# Patient Record
Sex: MALE | Race: WHITE | NOT HISPANIC OR LATINO | URBAN - METROPOLITAN AREA
[De-identification: names, ages, dates, MRNs, and addresses within clinical notes are randomized per-mention and may not be internally consistent; named-entity substitution may affect disease eponyms.]

---

## 2018-10-08 ENCOUNTER — APPOINTMENT (RX ONLY)
Dept: URBAN - METROPOLITAN AREA CLINIC 24 | Facility: CLINIC | Age: 58
Setting detail: DERMATOLOGY
End: 2018-10-08

## 2018-10-08 DIAGNOSIS — L57.0 ACTINIC KERATOSIS: ICD-10-CM

## 2018-10-08 DIAGNOSIS — L82.1 OTHER SEBORRHEIC KERATOSIS: ICD-10-CM

## 2018-10-08 DIAGNOSIS — D22 MELANOCYTIC NEVI: ICD-10-CM

## 2018-10-08 DIAGNOSIS — L91.8 OTHER HYPERTROPHIC DISORDERS OF THE SKIN: ICD-10-CM

## 2018-10-08 DIAGNOSIS — L82.0 INFLAMED SEBORRHEIC KERATOSIS: ICD-10-CM

## 2018-10-08 PROBLEM — L70.0 ACNE VULGARIS: Status: ACTIVE | Noted: 2018-10-08

## 2018-10-08 PROBLEM — H91.90 UNSPECIFIED HEARING LOSS, UNSPECIFIED EAR: Status: ACTIVE | Noted: 2018-10-08

## 2018-10-08 PROBLEM — D22.5 MELANOCYTIC NEVI OF TRUNK: Status: ACTIVE | Noted: 2018-10-08

## 2018-10-08 PROBLEM — J30.1 ALLERGIC RHINITIS DUE TO POLLEN: Status: ACTIVE | Noted: 2018-10-08

## 2018-10-08 PROCEDURE — 17110 DESTRUCTION B9 LES UP TO 14: CPT

## 2018-10-08 PROCEDURE — 17000 DESTRUCT PREMALG LESION: CPT | Mod: 59

## 2018-10-08 PROCEDURE — ? COUNSELING

## 2018-10-08 PROCEDURE — 17003 DESTRUCT PREMALG LES 2-14: CPT

## 2018-10-08 PROCEDURE — ? LIQUID NITROGEN

## 2018-10-08 PROCEDURE — 99243 OFF/OP CNSLTJ NEW/EST LOW 30: CPT | Mod: 25

## 2018-10-08 ASSESSMENT — LOCATION SIMPLE DESCRIPTION DERM
LOCATION SIMPLE: LEFT UPPER BACK
LOCATION SIMPLE: RIGHT CHEEK
LOCATION SIMPLE: RIGHT ANTERIOR NECK
LOCATION SIMPLE: RIGHT LOWER BACK
LOCATION SIMPLE: LEFT FOREHEAD
LOCATION SIMPLE: CHEST
LOCATION SIMPLE: UPPER BACK
LOCATION SIMPLE: LEFT CHEEK

## 2018-10-08 ASSESSMENT — LOCATION DETAILED DESCRIPTION DERM
LOCATION DETAILED: RIGHT INFERIOR LATERAL BUCCAL CHEEK
LOCATION DETAILED: RIGHT LATERAL BUCCAL CHEEK
LOCATION DETAILED: RIGHT CLAVICULAR NECK
LOCATION DETAILED: LEFT MEDIAL MALAR CHEEK
LOCATION DETAILED: LEFT FOREHEAD
LOCATION DETAILED: RIGHT SUPERIOR LATERAL MALAR CHEEK
LOCATION DETAILED: LEFT MEDIAL SUPERIOR CHEST
LOCATION DETAILED: RIGHT INFERIOR MEDIAL MIDBACK
LOCATION DETAILED: LEFT SUPERIOR LATERAL UPPER BACK
LOCATION DETAILED: SUPERIOR THORACIC SPINE

## 2018-10-08 ASSESSMENT — LOCATION ZONE DERM
LOCATION ZONE: TRUNK
LOCATION ZONE: NECK
LOCATION ZONE: FACE

## 2018-10-08 NOTE — PROCEDURE: LIQUID NITROGEN
Detail Level: Detailed
Post-Care Instructions: I reviewed with the patient in detail post-care instructions. Patient is to wear sunprotection, and avoid picking at any of the treated lesions. Pt may apply Vaseline to crusted or scabbing areas.
Render Post-Care Instructions In Note?: no
Medical Necessity Information: It is in your best interest to select a reason for this procedure from the list below. All of these items fulfill various CMS LCD requirements except the new and changing color options.
Consent: The patient's consent was obtained including but not limited to risks of crusting, scabbing, blistering, scarring, darker or lighter pigmentary change, recurrence, incomplete removal and infection.
Medical Necessity Clause: This procedure was medically necessary because the lesions was irritated and itchy.inflamed, caught by razor
Number Of Freeze-Thaw Cycles: 1 freeze-thaw cycle
Detail Level: Simple
Duration Of Freeze Thaw-Cycle (Seconds): 4

## 2022-06-14 ENCOUNTER — TELEPHONE (OUTPATIENT)
Dept: RHEUMATOLOGY | Age: 62
End: 2022-06-14

## 2022-06-14 NOTE — TELEPHONE ENCOUNTER
Patient called stating Dr Catrachita Campbell is referring him. We have no referral yet, notified patient we are booking out to the end of the year.

## 2022-09-26 ENCOUNTER — OFFICE VISIT (OUTPATIENT)
Dept: RHEUMATOLOGY | Age: 62
End: 2022-09-26
Payer: COMMERCIAL

## 2022-09-26 VITALS
SYSTOLIC BLOOD PRESSURE: 140 MMHG | HEART RATE: 88 BPM | DIASTOLIC BLOOD PRESSURE: 90 MMHG | HEIGHT: 71 IN | WEIGHT: 284 LBS | BODY MASS INDEX: 39.76 KG/M2

## 2022-09-26 DIAGNOSIS — M25.50 POLYARTHRALGIA: ICD-10-CM

## 2022-09-26 DIAGNOSIS — R76.8 ANTI-CYCLIC CITRULLINATED PEPTIDE ANTIBODY POSITIVE: Primary | ICD-10-CM

## 2022-09-26 DIAGNOSIS — Z87.09 HISTORY OF PULMONARY FIBROSIS: ICD-10-CM

## 2022-09-26 PROCEDURE — 99204 OFFICE O/P NEW MOD 45 MIN: CPT | Performed by: INTERNAL MEDICINE

## 2022-09-26 RX ORDER — NAPROXEN SODIUM 220 MG
220 TABLET ORAL
COMMUNITY

## 2022-09-26 RX ORDER — SILDENAFIL 50 MG/1
TABLET, FILM COATED ORAL
COMMUNITY
Start: 2022-08-11

## 2022-09-26 RX ORDER — ROSUVASTATIN CALCIUM 10 MG/1
TABLET, COATED ORAL
COMMUNITY
Start: 2022-06-22

## 2022-09-26 RX ORDER — OMEPRAZOLE 20 MG/1
CAPSULE, DELAYED RELEASE ORAL
COMMUNITY
Start: 2022-08-11

## 2022-09-26 ASSESSMENT — ROUTINE ASSESSMENT OF PATIENT INDEX DATA (RAPID3)
ON A SCALE OF ONE TO TEN, CONSIDERING ALL THE WAYS IN WHICH ILLNESS AND HEALTH CONDITIONS MAY AFFECT YOU AT THIS TIME, PLEASE INDICATE BELOW HOW YOU ARE DOING:: 4
ON A SCALE OF ONE TO TEN, HOW MUCH OF A PROBLEM HAS UNUSUAL FATIGUE OR TIREDNESS BEEN FOR YOU OVER THE PAST WEEK?: 4
ON A SCALE OF ONE TO TEN, HOW MUCH PAIN HAVE YOU HAD BECAUSE OF YOUR CONDITION OVER THE PAST WEEK?: 3
ON A SCALE OF ONE TO TEN, HOW DIFFICULT WAS IT FOR YOU TO COMPLETE THE LISTED DAILY PHYSICAL TASKS OVER THE LAST WEEK: 0.5
WHEN YOU AWAKENED IN THE MORNING OVER THE LAST WEEK, PLEASE INDICATE THE AMOUNT OF TIME IT TAKES UNTIL YOU ARE AS LIMBER AS YOU WILL BE FOR THE DAY: 15 MIN
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
## 2023-02-08 ENCOUNTER — TELEMEDICINE (OUTPATIENT)
Dept: RHEUMATOLOGY | Age: 63
End: 2023-02-08
Payer: COMMERCIAL

## 2023-02-08 DIAGNOSIS — Z87.09 HISTORY OF PULMONARY FIBROSIS: ICD-10-CM

## 2023-02-08 DIAGNOSIS — R76.8 ANTI-CYCLIC CITRULLINATED PEPTIDE ANTIBODY POSITIVE: Primary | ICD-10-CM

## 2023-02-08 DIAGNOSIS — M62.838 MUSCLE SPASM: ICD-10-CM

## 2023-02-08 PROCEDURE — 99214 OFFICE O/P EST MOD 30 MIN: CPT | Performed by: INTERNAL MEDICINE

## 2023-02-08 RX ORDER — TIZANIDINE 4 MG/1
TABLET ORAL
Qty: 10 TABLET | Refills: 3 | Status: SHIPPED | OUTPATIENT
Start: 2023-02-08

## 2023-02-08 NOTE — PROGRESS NOTES
Prisca Harris M.D.  [facility] [address] [facility], [facility] Jovita Gonzalez  Office : [phone], Fax: [phone] OFFICE VISIT NOTE  Date of Visit:  2023 2:22 PM    Patient Information:  Name:  Lisa Pugh  :  1960  Age:  63 y.o. Gender:  male      Mr. Lavelle Vance is here today for follow-up of a positive anti-CCP antibody titer with a history of pulmonary fibrosis. Last visit: 2022    History of Present Illness: On talking to the patient today he states that he has had pain and swelling of the left achilles with pain but no swelling of right achilles tendon area. He has also had ongoing joint pain as mentioned below. Since the last visit, patient is feeling \"fair\". Pain: 2-6/10  Location:  Some left shoulder pain. Some lower back pain and right mid back pain. No neck stiffness with no pain with neck ROM. Occasional frontal headaches. Some pain on the planter aspect with bilateral heel and achilles pain. Pain in the feet on the planter aspect. Quality:  Sharp to achy pain. Modifying Factors:  Coming down stairs worsens the heel and achilles pain. Associated Symptoms:  No tingling, numbness or pain down the arms or legs. No limitations with his ADL's. Last TB screen: Long time ago  TB result: Negative    Current dose of steroids: None.    How long on current dose of steroids: None  How long on continuous steroid therapy: None    Past DMARDs, if applicable (methotrexate, plaquenil/hydroxychloroquine, sulfasalazine, Arava/leflunomide): None     Past biologics, if applicable (enbrel, humira, simponi, cimzia, xeljanz, orencia, remicade, simponi aria, actemra, rituximab, otezla, stelara, cosentyx): None    Past NSAIDs, if applicable (motrin, aleve, naproxen, advil, ibuprofen, celebrex, voltaren/diclofenac, etc.): No    Last BMD: N/A  Past osteoporosis drugs, if applicable (fosamax, actonel, boniva, reclast, prolia, forteo): N/A    The patient otherwise has no significant interval changes in health or medical history to report. History Reviewed:    Past Medical History  Past Medical History:   Diagnosis Date    Heart disease     Heartburn        Past Surgical History  Past Surgical History:   Procedure Laterality Date    TUMOR REMOVAL Right     9th rib on back side       Family History  Family History   Problem Relation Age of Onset    Lung Cancer Mother     Breast Cancer Mother     Lung Cancer Father     Hypertension Father     Heart Disease Maternal Grandmother     Kidney Disease Maternal Grandfather     Heart Attack Paternal Grandmother     No Known Problems Paternal Grandfather        Social History  Social History     Socioeconomic History    Marital status:    Tobacco Use    Smoking status: Never    Smokeless tobacco: Never   Substance and Sexual Activity    Alcohol use: Never    Drug use: Not Currently               Allergy:  Allergies   Allergen Reactions    Codeine Other (See Comments)     Stomach pain         Current Medications:  Outpatient Encounter Medications as of 2/8/2023   Medication Sig Dispense Refill    tiZANidine (ZANAFLEX) 4 MG tablet Take 1/2 to 1 pill at bedtime as needed for muscle spasms. 10 tablet 3    naproxen sodium (ANAPROX) 220 MG tablet Take 220 mg by mouth      omeprazole (PRILOSEC) 20 MG delayed release capsule TAKE 1 CAPSULE BY MOUTH ONCE DAILY      rosuvastatin (CRESTOR) 10 MG tablet TAKE 1 TABLET BY MOUTH NIGHTLY      sildenafil (VIAGRA) 50 MG tablet Take 1 tablet  30 minutes prior to intercourse. Daily max: 50 mg       No facility-administered encounter medications on file as of 2/8/2023.            REVIEW OF SYSTEMS: The following systems were reviewed with patient today and were negative except for the following (depicted with an \"X\"):        \"X\" General  \"X\" Head and Neck  \"X\" Heart and Breathing  \"X\" Gastrointestinal    Fever/chills   Hair loss  x Shortness of breath   Upset stomach    Falls   Dry mouth   Coughing   Diarrhea / constipation    Wt loss   Mouth sores   Wheezing   Heartburn    Wt gain   Ringing ears   Chest pain   Dark or bloody stools    Night sweats   Diff. swallowing   None of above   Nausea or vomiting   X None of above  X None of above     X None of above                \"X\" Skin  \"X\" Neurology  \"X\" Urinary/Gyn  \"X\" Other    Easy bruising   Numbness/ tingling   Female problems   Depression    Rashes   Weakness   Problems with urination   Feeling anxious    Sun sensitivity  x Headaches  X None of above   Problems sleeping   X None of above   None of above     X None of above          Physical Exam:  There were no vitals taken for this visit. General:  Patient alert, cooperative and in no apparent distress. Neurologic:  Oriented, normal speech and affect. Radiology Reports Reviewed (if available):  Last 3 months  [unfilled]    Lab Reports Reviewed (if available): Last 3 months    No visits with results within 3 Month(s) from this visit. Latest known visit with results is:   No results found for any previous visit. The results above were reviewed and discussed with patient. Assessment/Plan:   Betzaida Bergman is a 63 y.o. male who presents with:     Anti-cyclic citrullinated peptide antibody positive: In light of a positive anti-CCP antibody titer and history of pulmonary fibrosis I will continue to monitor the patient for now. I did not feel the need to start the patient on an oral immunomodulating agent or DMARD at this time. History of pulmonary fibrosis: Patient was instructed to continue to follow-up with the pulmonologist for now. Muscle spasm: With regard to the muscle spasms he is having in the mid back area I did start him on tizanidine 4 mg half a pill to 1 pill to be taken at bedtime as needed to help his symptoms.   I did emphasize the need for him to take the medication at bedtime since taking it in the daytime could cause him to be extremely drowsy. Patient did verbalize understanding.  -     tiZANidine (ZANAFLEX) 4 MG tablet; Take 1/2 to 1 pill at bedtime as needed for muscle spasms. Kareem Collier, was evaluated through a synchronous (real-time) audio-video encounter. The patient (or guardian if applicable) is aware that this is a billable service, which includes applicable co-pays. This Virtual Visit was conducted with patient's (and/or legal guardian's) consent. The visit was conducted pursuant to the emergency declaration under the [address] [place] [address], [address] [place] [address] authority and the [employer]er Act. Patient identification was verified, and a caregiver was present when appropriate. The patient was located at home. Provider was located at home. --Juliet Galvez MD on 2/8/2023 at 2:22 PM    An electronic signature was used to authenticate this note. Disease activity plan:  As stated above. Steroid management plan:  As stated above, if applicable. Pain management plan:  As stated above, if applicable. Weight management plan:  Weight loss through diet and exercise is always encouraged    Disease prognosis: Good    I appreciate the opportunity to continue to participate in the care of this patient. Follow-up and Dispositions    Return in about 4 months (around 6/8/2023). Electronically signed by:  Kenyetta Kolb MD      This note was dictated using dragon voice recognition software.   It has been proofread, but there may still exist voice recognition errors that the author did not detect.                --------------------------------------------------------------------------------------------------------------------------------------------------------------------------------------------------------------------------------

## 2023-06-23 ENCOUNTER — OFFICE VISIT (OUTPATIENT)
Dept: RHEUMATOLOGY | Age: 63
End: 2023-06-23
Payer: COMMERCIAL

## 2023-06-23 VITALS
HEART RATE: 89 BPM | BODY MASS INDEX: 40.68 KG/M2 | SYSTOLIC BLOOD PRESSURE: 118 MMHG | HEIGHT: 71 IN | DIASTOLIC BLOOD PRESSURE: 88 MMHG | WEIGHT: 290.6 LBS

## 2023-06-23 DIAGNOSIS — Z87.09 HISTORY OF PULMONARY FIBROSIS: ICD-10-CM

## 2023-06-23 DIAGNOSIS — M62.838 MUSCLE SPASM: ICD-10-CM

## 2023-06-23 DIAGNOSIS — R76.8 ANTI-CYCLIC CITRULLINATED PEPTIDE ANTIBODY POSITIVE: Primary | ICD-10-CM

## 2023-06-23 PROCEDURE — 99214 OFFICE O/P EST MOD 30 MIN: CPT | Performed by: INTERNAL MEDICINE

## 2023-06-23 ASSESSMENT — ROUTINE ASSESSMENT OF PATIENT INDEX DATA (RAPID3)
WHEN YOU AWAKENED IN THE MORNING OVER THE LAST WEEK, PLEASE INDICATE THE AMOUNT OF TIME IT TAKES UNTIL YOU ARE AS LIMBER AS YOU WILL BE FOR THE DAY: 45 MIN
ON A SCALE OF ONE TO TEN, HOW MUCH OF A PROBLEM HAS UNUSUAL FATIGUE OR TIREDNESS BEEN FOR YOU OVER THE PAST WEEK?: 8
ON A SCALE OF ONE TO TEN, CONSIDERING ALL THE WAYS IN WHICH ILLNESS AND HEALTH CONDITIONS MAY AFFECT YOU AT THIS TIME, PLEASE INDICATE BELOW HOW YOU ARE DOING:: 4
ON A SCALE OF ONE TO TEN, HOW MUCH PAIN HAVE YOU HAD BECAUSE OF YOUR CONDITION OVER THE PAST WEEK?: 4
ON A SCALE OF ONE TO TEN, HOW DIFFICULT WAS IT FOR YOU TO COMPLETE THE LISTED DAILY PHYSICAL TASKS OVER THE LAST WEEK: 0.6

## 2023-06-23 ASSESSMENT — JOINT PAIN
TOTAL NUMBER OF SWOLLEN JOINTS: 0
TOTAL NUMBER OF TENDER JOINTS: 5

## 2023-06-23 NOTE — PROGRESS NOTES
[facility] [address] [facility], [facility]  Office : [phone], Fax: [phone] OFFICE VISIT NOTE  Date of Visit:  2023 8:48 AM    Patient Information:  Name:  Wood Dennison  :  1960  Age:  63 y.o. Gender:  male      Mr. Fco Stuart is here today for follow-up of muscle spasm with a positive anti CCP antibody titer and a history of pulmonary fibrosis. Last visit: 2023    History of Present Illness: On talking to the patient today he states that he has had some cough with no sputum with some PND and occasional shortness of breath secondary to his h/o pulmonary fibrosis. Has been using his CPAP at night when he sleeps to treat his sleep apnea. His current joint complaints are as mentioned below. Since the last visit, patient is feeling \"fair\". Pain: 10  Location:  Some left shoulder pain. Some lower back pain and mid back pain. Occasional occipital headaches. Some left elbow pain with swelling with some warmth and redness. Some right SI joint pain. Some left knee pain with swelling with occasional buckling of the left knee. Pain in the heel of both his feet. Bilateral ankle pain with swelling with no buckling with no warmth and redness. Left achilles pain and swelling worse than the right achilles pain and swelling. Some pain and swelling of the MCP and PIP joints with no warmth and redness. Quality:  Burning pain. Modifying Factors:  1st thing in the morning the pain and stiffness is the worst.   Associated Symptoms:  No tingling, numbness or pain down the arms or legs. Has no limitations with his ADL's. DMARD/Biologic 2023   AM Stiffness 45 min   Pain 4   Fatigue 8   MDHAQ 0.6   Patient Global Score 4   Medication Name Other   Other Medication Muscle spasm     Last TB screen: Long time ago  TB result: Negative     Current dose of steroids: None.    How long on current dose of

## 2023-06-25 RX ORDER — TIZANIDINE 4 MG/1
TABLET ORAL
Qty: 10 TABLET | Refills: 3 | Status: SHIPPED | OUTPATIENT
Start: 2023-06-25

## 2023-07-14 ENCOUNTER — HOSPITAL ENCOUNTER (OUTPATIENT)
Dept: GENERAL RADIOLOGY | Age: 63
Discharge: HOME | End: 2023-07-14
Payer: COMMERCIAL

## 2023-07-14 DIAGNOSIS — M25.50 POLYARTHRALGIA: ICD-10-CM

## 2023-07-14 PROCEDURE — 72100 X-RAY EXAM L-S SPINE 2/3 VWS: CPT

## 2023-07-14 PROCEDURE — 73620 X-RAY EXAM OF FOOT: CPT

## 2023-07-14 PROCEDURE — 72070 X-RAY EXAM THORAC SPINE 2VWS: CPT

## 2023-07-14 PROCEDURE — 73120 X-RAY EXAM OF HAND: CPT

## 2023-07-14 PROCEDURE — 72202 X-RAY EXAM SI JOINTS 3/> VWS: CPT

## 2025-08-13 ENCOUNTER — HOSPITAL ENCOUNTER (OUTPATIENT)
Dept: GENERAL RADIOLOGY | Age: 65
Discharge: HOME | End: 2025-08-15
Payer: COMMERCIAL

## 2025-08-13 DIAGNOSIS — M25.50 POLYARTHRALGIA: ICD-10-CM

## 2025-08-13 PROCEDURE — 73620 X-RAY EXAM OF FOOT: CPT

## 2025-08-13 PROCEDURE — 73120 X-RAY EXAM OF HAND: CPT

## 2025-08-13 PROCEDURE — 72202 X-RAY EXAM SI JOINTS 3/> VWS: CPT
